# Patient Record
Sex: MALE | Race: WHITE | NOT HISPANIC OR LATINO | URBAN - METROPOLITAN AREA
[De-identification: names, ages, dates, MRNs, and addresses within clinical notes are randomized per-mention and may not be internally consistent; named-entity substitution may affect disease eponyms.]

---

## 2017-02-22 ENCOUNTER — EMERGENCY (EMERGENCY)
Facility: HOSPITAL | Age: 32
LOS: 1 days | Discharge: HOME | End: 2017-02-22
Attending: EMERGENCY MEDICINE | Admitting: EMERGENCY MEDICINE
Payer: COMMERCIAL

## 2017-02-22 VITALS
SYSTOLIC BLOOD PRESSURE: 145 MMHG | TEMPERATURE: 98 F | DIASTOLIC BLOOD PRESSURE: 95 MMHG | OXYGEN SATURATION: 98 % | RESPIRATION RATE: 18 BRPM | HEART RATE: 70 BPM

## 2017-02-22 VITALS
OXYGEN SATURATION: 100 % | DIASTOLIC BLOOD PRESSURE: 96 MMHG | SYSTOLIC BLOOD PRESSURE: 138 MMHG | TEMPERATURE: 98 F | RESPIRATION RATE: 16 BRPM | HEART RATE: 65 BPM

## 2017-02-22 DIAGNOSIS — M79.642 PAIN IN LEFT HAND: ICD-10-CM

## 2017-02-22 PROCEDURE — 12041 INTMD RPR N-HF/GENIT 2.5CM/<: CPT

## 2017-02-22 PROCEDURE — 99283 EMERGENCY DEPT VISIT LOW MDM: CPT | Mod: 25

## 2017-02-22 PROCEDURE — 73130 X-RAY EXAM OF HAND: CPT

## 2017-02-22 PROCEDURE — 73130 X-RAY EXAM OF HAND: CPT | Mod: 26,76,LT

## 2017-02-22 PROCEDURE — 90471 IMMUNIZATION ADMIN: CPT

## 2017-02-22 PROCEDURE — 90715 TDAP VACCINE 7 YRS/> IM: CPT

## 2017-02-22 RX ORDER — TETANUS TOXOID, REDUCED DIPHTHERIA TOXOID AND ACELLULAR PERTUSSIS VACCINE, ADSORBED 5; 2.5; 8; 8; 2.5 [IU]/.5ML; [IU]/.5ML; UG/.5ML; UG/.5ML; UG/.5ML
0.5 SUSPENSION INTRAMUSCULAR ONCE
Qty: 0 | Refills: 0 | Status: COMPLETED | OUTPATIENT
Start: 2017-02-22 | End: 2017-02-22

## 2017-02-22 RX ADMIN — TETANUS TOXOID, REDUCED DIPHTHERIA TOXOID AND ACELLULAR PERTUSSIS VACCINE, ADSORBED 0.5 MILLILITER(S): 5; 2.5; 8; 8; 2.5 SUSPENSION INTRAMUSCULAR at 13:04

## 2017-02-22 NOTE — ED PROVIDER NOTE - PHYSICAL EXAMINATION
Attending note. Patient is alert and oriented and in no acute distress. There is a 2 cm laceration on the distal end of the left hand stump. Small metal fragments are visible. Sensation is normal.

## 2017-02-22 NOTE — ED PROVIDER NOTE - CARE PLAN
Principal Discharge DX:	Laceration of left hand, initial encounter  Secondary Diagnosis:	Foreign body (FB) in soft tissue Principal Discharge DX:	Laceration of left hand, initial encounter  Instructions for follow-up, activity and diet:	1. Follow up with your PMD within 48-72 hours for wound check.  2. Keep sutures covered and dry for 24 hours then clean with soap and water daily.  Apply bacitracin and cover.  Return to ED or PMD for suture removal 10-14 days.  3. Any increased pain, redness, streaking (red lines), swelling, purulent drainage, feve/chills return to ER.  Secondary Diagnosis:	Foreign body (FB) in soft tissue

## 2017-02-22 NOTE — ED ADULT NURSE NOTE - OBJECTIVE STATEMENT
30yo male pt AxOx3 ambulatory to ED s/p finger injury. Pt states he was working w/ drillbit and lost  and drillbit cut left thumb. 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 Pt denies pain/discomfort at this time. NAD noted. PHUONG. MD at bedside for eval. Awaiting dispo.

## 2017-02-22 NOTE — ED PROVIDER NOTE - PLAN OF CARE
1. Follow up with your PMD within 48-72 hours for wound check.  2. Keep sutures covered and dry for 24 hours then clean with soap and water daily.  Apply bacitracin and cover.  Return to ED or PMD for suture removal 10-14 days.  3. Any increased pain, redness, streaking (red lines), swelling, purulent drainage, feve/chills return to ER.

## 2017-02-22 NOTE — ED PROVIDER NOTE - OBJECTIVE STATEMENT
31M no PMH presenting with L hand laceration s/p drilling metal 1 hour ago. Pt reports drill shavings in hand that he tried to pick out himself. Unknown last tetanus. Denies numbness/tingling, fever/chills. 31M no PMH presenting with L hand laceration s/p drilling metal 1 hour ago. Pt reports drill shavings in hand that he tried to pick out himself. Unknown last tetanus. Denies numbness/tingling, fever/chills.       Attending note. Patient was seen in fast track room #5. Patient was drilling brass when the drill slips in and drill bits into his left hand. Patient picked out metal fragments. Patient denies any numbness or paresthesia. 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

## 2017-02-22 NOTE — ED PROVIDER NOTE - MEDICAL DECISION MAKING DETAILS
attending note-laceration of left hand stump with metallic for bodies. Removal of all visible metallic from bodies and an x-ray, tetanus prophylaxis

## 2017-02-25 ENCOUNTER — EMERGENCY (EMERGENCY)
Facility: HOSPITAL | Age: 32
LOS: 1 days | Discharge: HOME | End: 2017-02-25
Attending: EMERGENCY MEDICINE | Admitting: EMERGENCY MEDICINE
Payer: COMMERCIAL

## 2017-02-25 VITALS
RESPIRATION RATE: 16 BRPM | DIASTOLIC BLOOD PRESSURE: 97 MMHG | WEIGHT: 149.91 LBS | SYSTOLIC BLOOD PRESSURE: 157 MMHG | HEIGHT: 68 IN | HEART RATE: 50 BPM | OXYGEN SATURATION: 100 %

## 2017-02-25 DIAGNOSIS — Z88.2 ALLERGY STATUS TO SULFONAMIDES: ICD-10-CM

## 2017-02-25 DIAGNOSIS — R10.9 UNSPECIFIED ABDOMINAL PAIN: ICD-10-CM

## 2017-02-25 DIAGNOSIS — N23 UNSPECIFIED RENAL COLIC: ICD-10-CM

## 2017-02-25 LAB
ALBUMIN SERPL ELPH-MCNC: 4.6 G/DL — SIGNIFICANT CHANGE UP (ref 3.3–5)
ALP SERPL-CCNC: 49 U/L — SIGNIFICANT CHANGE UP (ref 40–120)
ALT FLD-CCNC: 23 U/L RC — SIGNIFICANT CHANGE UP (ref 10–45)
ANION GAP SERPL CALC-SCNC: 16 MMOL/L — SIGNIFICANT CHANGE UP (ref 5–17)
APPEARANCE UR: CLEAR — SIGNIFICANT CHANGE UP
APTT BLD: 29 SEC — SIGNIFICANT CHANGE UP (ref 27.5–37.4)
AST SERPL-CCNC: 23 U/L — SIGNIFICANT CHANGE UP (ref 10–40)
BASE EXCESS BLDV CALC-SCNC: 1.1 MMOL/L — SIGNIFICANT CHANGE UP (ref -2–2)
BASOPHILS # BLD AUTO: 0.1 K/UL — SIGNIFICANT CHANGE UP (ref 0–0.2)
BASOPHILS NFR BLD AUTO: 0.5 % — SIGNIFICANT CHANGE UP (ref 0–2)
BILIRUB SERPL-MCNC: 0.5 MG/DL — SIGNIFICANT CHANGE UP (ref 0.2–1.2)
BILIRUB UR-MCNC: NEGATIVE — SIGNIFICANT CHANGE UP
BUN SERPL-MCNC: 20 MG/DL — SIGNIFICANT CHANGE UP (ref 7–23)
CA-I SERPL-SCNC: 1.29 MMOL/L — SIGNIFICANT CHANGE UP (ref 1.12–1.3)
CALCIUM SERPL-MCNC: 9.6 MG/DL — SIGNIFICANT CHANGE UP (ref 8.4–10.5)
CHLORIDE BLDV-SCNC: 105 MMOL/L — SIGNIFICANT CHANGE UP (ref 96–108)
CHLORIDE SERPL-SCNC: 101 MMOL/L — SIGNIFICANT CHANGE UP (ref 96–108)
CO2 BLDV-SCNC: 30 MMOL/L — SIGNIFICANT CHANGE UP (ref 22–30)
CO2 SERPL-SCNC: 25 MMOL/L — SIGNIFICANT CHANGE UP (ref 22–31)
COLOR SPEC: SIGNIFICANT CHANGE UP
CREAT SERPL-MCNC: 1.24 MG/DL — SIGNIFICANT CHANGE UP (ref 0.5–1.3)
DIFF PNL FLD: ABNORMAL
EOSINOPHIL # BLD AUTO: 0.2 K/UL — SIGNIFICANT CHANGE UP (ref 0–0.5)
EOSINOPHIL NFR BLD AUTO: 1.3 % — SIGNIFICANT CHANGE UP (ref 0–6)
EPI CELLS # UR: SIGNIFICANT CHANGE UP /HPF
GAS PNL BLDV: 141 MMOL/L — SIGNIFICANT CHANGE UP (ref 136–145)
GAS PNL BLDV: SIGNIFICANT CHANGE UP
GAS PNL BLDV: SIGNIFICANT CHANGE UP
GLUCOSE BLDV-MCNC: 100 MG/DL — HIGH (ref 70–99)
GLUCOSE SERPL-MCNC: 102 MG/DL — HIGH (ref 70–99)
GLUCOSE UR QL: NEGATIVE — SIGNIFICANT CHANGE UP
HCO3 BLDV-SCNC: 28 MMOL/L — SIGNIFICANT CHANGE UP (ref 21–29)
HCT VFR BLD CALC: 44.4 % — SIGNIFICANT CHANGE UP (ref 39–50)
HCT VFR BLDA CALC: 48 % — SIGNIFICANT CHANGE UP (ref 39–50)
HGB BLD CALC-MCNC: 15.5 G/DL — SIGNIFICANT CHANGE UP (ref 13–17)
HGB BLD-MCNC: 15.2 G/DL — SIGNIFICANT CHANGE UP (ref 13–17)
INR BLD: 0.92 RATIO — SIGNIFICANT CHANGE UP (ref 0.88–1.16)
KETONES UR-MCNC: NEGATIVE — SIGNIFICANT CHANGE UP
LACTATE BLDV-MCNC: 1.5 MMOL/L — SIGNIFICANT CHANGE UP (ref 0.7–2)
LEUKOCYTE ESTERASE UR-ACNC: NEGATIVE — SIGNIFICANT CHANGE UP
LYMPHOCYTES # BLD AUTO: 1.3 K/UL — SIGNIFICANT CHANGE UP (ref 1–3.3)
LYMPHOCYTES # BLD AUTO: 10.4 % — LOW (ref 13–44)
MCHC RBC-ENTMCNC: 31.5 PG — SIGNIFICANT CHANGE UP (ref 27–34)
MCHC RBC-ENTMCNC: 34.2 GM/DL — SIGNIFICANT CHANGE UP (ref 32–36)
MCV RBC AUTO: 92.1 FL — SIGNIFICANT CHANGE UP (ref 80–100)
MONOCYTES # BLD AUTO: 0.6 K/UL — SIGNIFICANT CHANGE UP (ref 0–0.9)
MONOCYTES NFR BLD AUTO: 4.9 % — SIGNIFICANT CHANGE UP (ref 2–14)
NEUTROPHILS # BLD AUTO: 10.6 K/UL — HIGH (ref 1.8–7.4)
NEUTROPHILS NFR BLD AUTO: 82.8 % — HIGH (ref 43–77)
NITRITE UR-MCNC: NEGATIVE — SIGNIFICANT CHANGE UP
PCO2 BLDV: 55 MMHG — HIGH (ref 35–50)
PH BLDV: 7.32 — LOW (ref 7.35–7.45)
PH UR: 6 — SIGNIFICANT CHANGE UP (ref 4.8–8)
PLATELET # BLD AUTO: 181 K/UL — SIGNIFICANT CHANGE UP (ref 150–400)
PO2 BLDV: 37 MMHG — SIGNIFICANT CHANGE UP (ref 25–45)
POTASSIUM BLDV-SCNC: 3.6 MMOL/L — SIGNIFICANT CHANGE UP (ref 3.5–5)
POTASSIUM SERPL-MCNC: 3.9 MMOL/L — SIGNIFICANT CHANGE UP (ref 3.5–5.3)
POTASSIUM SERPL-SCNC: 3.9 MMOL/L — SIGNIFICANT CHANGE UP (ref 3.5–5.3)
PROT SERPL-MCNC: 7.1 G/DL — SIGNIFICANT CHANGE UP (ref 6–8.3)
PROT UR-MCNC: NEGATIVE — SIGNIFICANT CHANGE UP
PROTHROM AB SERPL-ACNC: 10 SEC — SIGNIFICANT CHANGE UP (ref 10–13.1)
RBC # BLD: 4.82 M/UL — SIGNIFICANT CHANGE UP (ref 4.2–5.8)
RBC # FLD: 11.2 % — SIGNIFICANT CHANGE UP (ref 10.3–14.5)
RBC CASTS # UR COMP ASSIST: ABNORMAL /HPF (ref 0–2)
SAO2 % BLDV: 67 % — SIGNIFICANT CHANGE UP (ref 67–88)
SODIUM SERPL-SCNC: 142 MMOL/L — SIGNIFICANT CHANGE UP (ref 135–145)
SP GR SPEC: 1.01 — LOW (ref 1.01–1.02)
UROBILINOGEN FLD QL: NEGATIVE — SIGNIFICANT CHANGE UP
WBC # BLD: 12.8 K/UL — HIGH (ref 3.8–10.5)
WBC # FLD AUTO: 12.8 K/UL — HIGH (ref 3.8–10.5)
WBC UR QL: SIGNIFICANT CHANGE UP /HPF (ref 0–5)

## 2017-02-25 PROCEDURE — 87086 URINE CULTURE/COLONY COUNT: CPT

## 2017-02-25 PROCEDURE — 84295 ASSAY OF SERUM SODIUM: CPT

## 2017-02-25 PROCEDURE — 82947 ASSAY GLUCOSE BLOOD QUANT: CPT

## 2017-02-25 PROCEDURE — 85027 COMPLETE CBC AUTOMATED: CPT

## 2017-02-25 PROCEDURE — 82330 ASSAY OF CALCIUM: CPT

## 2017-02-25 PROCEDURE — 85014 HEMATOCRIT: CPT

## 2017-02-25 PROCEDURE — 85730 THROMBOPLASTIN TIME PARTIAL: CPT

## 2017-02-25 PROCEDURE — 85610 PROTHROMBIN TIME: CPT

## 2017-02-25 PROCEDURE — 82435 ASSAY OF BLOOD CHLORIDE: CPT

## 2017-02-25 PROCEDURE — 99284 EMERGENCY DEPT VISIT MOD MDM: CPT | Mod: 25

## 2017-02-25 PROCEDURE — 84132 ASSAY OF SERUM POTASSIUM: CPT

## 2017-02-25 PROCEDURE — 99285 EMERGENCY DEPT VISIT HI MDM: CPT | Mod: 25

## 2017-02-25 PROCEDURE — 81001 URINALYSIS AUTO W/SCOPE: CPT

## 2017-02-25 PROCEDURE — 83605 ASSAY OF LACTIC ACID: CPT

## 2017-02-25 PROCEDURE — 82803 BLOOD GASES ANY COMBINATION: CPT

## 2017-02-25 PROCEDURE — 74177 CT ABD & PELVIS W/CONTRAST: CPT | Mod: 26

## 2017-02-25 PROCEDURE — 99053 MED SERV 10PM-8AM 24 HR FAC: CPT

## 2017-02-25 PROCEDURE — 96374 THER/PROPH/DIAG INJ IV PUSH: CPT | Mod: XU

## 2017-02-25 PROCEDURE — 80053 COMPREHEN METABOLIC PANEL: CPT

## 2017-02-25 PROCEDURE — 74177 CT ABD & PELVIS W/CONTRAST: CPT

## 2017-02-25 RX ORDER — KETOROLAC TROMETHAMINE 30 MG/ML
30 SYRINGE (ML) INJECTION ONCE
Qty: 0 | Refills: 0 | Status: DISCONTINUED | OUTPATIENT
Start: 2017-02-25 | End: 2017-02-25

## 2017-02-25 RX ORDER — OXYCODONE HYDROCHLORIDE 5 MG/1
1 TABLET ORAL
Qty: 15 | Refills: 0 | OUTPATIENT
Start: 2017-02-25

## 2017-02-25 RX ORDER — SODIUM CHLORIDE 9 MG/ML
3 INJECTION INTRAMUSCULAR; INTRAVENOUS; SUBCUTANEOUS ONCE
Qty: 0 | Refills: 0 | Status: COMPLETED | OUTPATIENT
Start: 2017-02-25 | End: 2017-02-25

## 2017-02-25 RX ORDER — SODIUM CHLORIDE 9 MG/ML
1000 INJECTION INTRAMUSCULAR; INTRAVENOUS; SUBCUTANEOUS
Qty: 0 | Refills: 0 | Status: DISCONTINUED | OUTPATIENT
Start: 2017-02-25 | End: 2017-03-01

## 2017-02-25 RX ADMIN — SODIUM CHLORIDE 125 MILLILITER(S): 9 INJECTION INTRAMUSCULAR; INTRAVENOUS; SUBCUTANEOUS at 08:44

## 2017-02-25 RX ADMIN — Medication 30 MILLIGRAM(S): at 12:39

## 2017-02-25 RX ADMIN — SODIUM CHLORIDE 3 MILLILITER(S): 9 INJECTION INTRAMUSCULAR; INTRAVENOUS; SUBCUTANEOUS at 08:37

## 2017-02-25 NOTE — ED PROVIDER NOTE - PHYSICAL EXAMINATION
att exam: patient awake alert NAD . LUNGS CTAB no wheeze no crackle. CARD RRR no m/r/g.  Abdomen soft TTP to the RLQ ND no rebound no guarding no CVA tenderness. EXT WWP no edema no calf tenderness CV 2+DP/PT bilaterally. neuro A&Ox3 gait normal.  skin warm and dry no rash

## 2017-02-25 NOTE — ED ADULT NURSE NOTE - OBJECTIVE STATEMENT
Pt presents to the ER A+Ox3 complaining of left lower quadrant pain radiating to middle lower abdominal pain, nausea, vomiting, and chills. Pt describes the pain as "always there but intensifies in waves;" pain was "severe enough to wake me up out of my sleep last night." Pt denies fever, diarrhea, difficulty or painful urination.

## 2017-02-25 NOTE — ED PROVIDER NOTE - OBJECTIVE STATEMENT
33 yo with no hx comes to the ED with complaints of abd pain that began last night on and off and now constant, had BM today, no bleeding no dysuria no  hematuria, + n/v no f.c no cp no sob. no previous abd surgeries

## 2017-02-26 LAB
CULTURE RESULTS: NO GROWTH — SIGNIFICANT CHANGE UP
SPECIMEN SOURCE: SIGNIFICANT CHANGE UP

## 2017-02-27 NOTE — ED PROCEDURE NOTE - CPROC ED POST PROC CARE GUIDE1
Verbal/written post procedure instructions were given to patient/caregiver.
Instructed patient/caregiver regarding signs and symptoms of infection./Instructed patient/caregiver to follow-up with primary care physician./Verbal/written post procedure instructions were given to patient/caregiver.

## 2018-08-24 ENCOUNTER — TRANSCRIPTION ENCOUNTER (OUTPATIENT)
Age: 33
End: 2018-08-24

## 2022-09-19 ENCOUNTER — EMERGENCY (EMERGENCY)
Facility: HOSPITAL | Age: 37
LOS: 1 days | Discharge: HOME | End: 2022-09-19
Payer: SELF-PAY

## 2022-09-19 VITALS
RESPIRATION RATE: 16 BRPM | HEIGHT: 69 IN | WEIGHT: 160.06 LBS | SYSTOLIC BLOOD PRESSURE: 160 MMHG | HEART RATE: 74 BPM | DIASTOLIC BLOOD PRESSURE: 99 MMHG | OXYGEN SATURATION: 97 % | TEMPERATURE: 98 F

## 2022-09-19 VITALS
HEART RATE: 60 BPM | RESPIRATION RATE: 16 BRPM | TEMPERATURE: 98 F | DIASTOLIC BLOOD PRESSURE: 104 MMHG | OXYGEN SATURATION: 99 % | SYSTOLIC BLOOD PRESSURE: 157 MMHG

## 2022-09-19 PROCEDURE — 29125 APPL SHORT ARM SPLINT STATIC: CPT

## 2022-09-19 PROCEDURE — 29130 APPL FINGER SPLINT STATIC: CPT | Mod: F5

## 2022-09-19 PROCEDURE — 73140 X-RAY EXAM OF FINGER(S): CPT

## 2022-09-19 PROCEDURE — 73130 X-RAY EXAM OF HAND: CPT

## 2022-09-19 PROCEDURE — 73130 X-RAY EXAM OF HAND: CPT | Mod: 26,RT

## 2022-09-19 PROCEDURE — 99283 EMERGENCY DEPT VISIT LOW MDM: CPT | Mod: 25

## 2022-09-19 PROCEDURE — 99284 EMERGENCY DEPT VISIT MOD MDM: CPT | Mod: 25

## 2022-09-19 NOTE — ED PROVIDER NOTE - SHIFT CHANGE DETAILS
Attending MD Stallworth: 37M with R thumb pain, +snuff box tenderness, pending XR, ulnar lig intact, will need thumb spica

## 2022-09-19 NOTE — ED PROVIDER NOTE - NSFOLLOWUPCLINICS_GEN_ALL_ED_FT
[facility] Orthopedic Surgery  Orthopedic Surgery  [address], [address]rd & 4th [place] [address]  Phone: [phone]  Fax:

## 2022-09-19 NOTE — ED PROVIDER NOTE - ADDITIONAL NOTES AND INSTRUCTIONS:
Please excuse Mr. Rodrigues from work involving significant use of his R hand or thumb due to thumb injury.

## 2022-09-19 NOTE — ED ADULT NURSE NOTE - NSIMPLEMENTINTERV_GEN_ALL_ED
Implemented All Universal Safety Interventions:  [place] to call system. Call bell, personal items and telephone within reach. Instruct patient to call for assistance. Room bathroom lighting operational. Non-slip footwear when patient is off stretcher. Physically safe environment: no spills, clutter or unnecessary equipment. Stretcher in lowest position, wheels locked, appropriate side rails in place.

## 2022-09-19 NOTE — ED PROVIDER NOTE - PROGRESS NOTE DETAILS
Lance Hankins MD PGY1: XR neg, will place in thumb spica. Lance Hankins MD PGY1: Pt placed in thumb spica, see procedure note. Discussed with pt splint care, follow up with ortho, analgesia, return precautions. Pt understands is amenable to plan. Attending MD Stallworth: Patient re-evaluated and radiology test reviewed with patient.  Explained thumb spica for snuffbox tenderness, explained although mechanism not consistent, concern for scaphoid injury given snuffbox tenderness.  Will follow up with hand.  Patient stable for discharge. Follow up instructions given, importance of follow up emphasized, return to ED parameters reviewed and patient verbalized understanding.  All questions answered, all concerns addressed.

## 2022-09-19 NOTE — ED PROVIDER NOTE - PATIENT PORTAL LINK FT
You can access the FollowMyHealth Patient Portal offered by [facility] by registering at the following website: [email or web address]. By joining [employer]’s FollowMyHealth portal, you will also be able to view your health information using other applications (apps) compatible with our system.

## 2022-09-19 NOTE — ED ADULT NURSE NOTE - OBJECTIVE STATEMENT
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 pt AOx3 speaking in full complete sentences at present. pt denies any lightheadedness, dizziness, chest pain, SOB, abd. pain, n/v/d, numbness/tingling at present.

## 2022-09-19 NOTE — ED PROVIDER NOTE - PHYSICAL EXAMINATION
CONSTITUTIONAL: Well-developed; well-nourished; in no acute distress.   SKIN: warm, dry  HEAD: Normocephalic; atraumatic.  EYES: no conjunctival injection. no scleral icterus  ENT: No nasal discharge; airway clear.  NECK: Supple; good ROM  CARD: R Thumb with good cap refill and color, radial pulse intact  ABD: soft nondistended  EXT: R side snuffbox tenderness, thumb with full ROM, 4+/5 strength to adduction/abduction, no UCL laxity, full sensation  NEURO: Alert, oriented, grossly unremarkable  PSYCH: Cooperative, appropriate. CONSTITUTIONAL: Well-developed; well-nourished; in no acute distress.   SKIN: warm, dry  HEAD: Normocephalic; atraumatic.  EYES: no conjunctival injection. no scleral icterus  ENT: No nasal discharge; airway clear.  NECK: Supple; good ROM  CARD: R Thumb with good cap refill and color, radial pulse intact  ABD: soft nondistended  EXT: R side snuffbox tenderness, thumb with full ROM, 4+/5 strength to adduction/abduction, no UCL laxity, full sensation. Hypoplastic L fingers  NEURO: Alert, oriented, grossly unremarkable  PSYCH: Cooperative, appropriate.

## 2022-09-19 NOTE — ED PROVIDER NOTE - NSFOLLOWUPINSTRUCTIONS_ED_ALL_ED_FT
You were seen in the ED for thumb pain following an injury.    X rays showed no fracture. You were placed in a thumb splint to protect your scaphoid bone. Please keep this splint dry, using a cast bag for showering.    Please follow up with the hand orthopedist as discussed. Contact information with orthopedics is below.    You may take tylenol as per package instructions for pain.    Please seek medical attention if you lose feeling in your fingers, you have significantly worsening pain, or if you develop new fevers, rashes, or swelling. You were seen in the ED for thumb pain following an injury.    X rays showed no fracture. You were placed in a thumb splint to protect your scaphoid bone. Please keep this splint dry, using a cast bag for showering.    Please follow up with the hand orthopedist as discussed. Contact information with orthopedics is below.    You may take tylenol as per package instructions for pain.    Please seek medical attention if you lose feeling in your fingers, you have significantly worsening pain, or if you develop new fevers, rashes, or swelling.    While here, your blood pressure was elevated.  Please follow up with your primary care doctor in regards to this matter in the next 1-3 days.

## 2022-09-19 NOTE — ED ADULT NURSE NOTE - NSFALLRSKHARMRISK_ED_ALL_ED
[FreeTextEntry8] : Pt c/o radicular pain in RLE for past 3 months. Pt reports there is pain that shoots down his leg into his foot whenever he touches lateral aspect of leg. Pain is sharp and shooting, described as electric and severe. Pain has superficial skin lesion on lateral leg which has remained stable. Denies trauma to leg. Pain has been persistent in past several months despite seeing a chiropractor. Pain has started to occur on L leg as well in past 1-2 weeks. Pt has seen neuro and derm who suspect there may be a lumbar radiculopathy component to pain. no

## 2022-09-19 NOTE — ED PROVIDER NOTE - OBJECTIVE STATEMENT
Pt is a 37m with no sig pmh presenting with R thumb pain. Began at 10am this morning when hyperextending thumb while lifting heavy objects at work as . Had pain after incident gradually worsening until now. Denies sensory loss, tingling. Is able to move thumb with some pain, has reduced  strength. Has not taken pain medications for thumb.

## 2022-09-19 NOTE — ED PROVIDER NOTE - ATTENDING CONTRIBUTION TO CARE
MD Cuevas:  patient seen and evaluated with the resident.  I was present for key portions of the History & Physical, and I agree with the Impression & Plan.  36 yo M, c/o R thumb pain, base of metacarpal  Duration: onset 5hrs ago.  Quality: sharp pain  Intensity:  6/10  Gen:  Well appearing in NAD.  Head:  NC/AT.  Resp: No distress.    Ext: L hand hypoplastic  R hand:  +pain in snuffbox and at base of thumb metacarpal.  Skin: warm and dry as visualized.  Neuro: alert and oriented to person, place, time.  Impression:  R thumb injury with snuffbox TTP  Plan:  will need thumb spica regardless if xrays positive or negative.

## 2022-09-19 NOTE — ED PROVIDER NOTE - CLINICAL SUMMARY MEDICAL DECISION MAKING FREE TEXT BOX
Electrodesiccation Text: The wound bed was treated with electrodesiccation after the biopsy was performed. Pt is a 37m with no sig pmh presenting with R thumb pain. Currently with vss, no neurological deficit, thumb pain following hyperextension injury, no UCL laxity c/f gamekeeper's thumb, with snuffbox tenderness. Concern for fracture vs sprain, to be placed in thumb spica following imaging given snuffbox tenderness. Declines analgesia at this time.

## 2022-09-19 NOTE — ED ADULT NURSE NOTE - NS ED NURSE DISCH DISPOSITION
[facility]  PDMP information  Reviewed by me. Therapy appropriate.prescription appropriate.   Discharged

## 2022-09-22 ENCOUNTER — APPOINTMENT (OUTPATIENT)
Dept: ORTHOPEDIC SURGERY | Facility: CLINIC | Age: 37
End: 2022-09-22

## 2022-10-05 ENCOUNTER — APPOINTMENT (OUTPATIENT)
Dept: MRI IMAGING | Facility: CLINIC | Age: 37
End: 2022-10-05

## 2022-10-17 ENCOUNTER — OUTPATIENT (OUTPATIENT)
Dept: OUTPATIENT SERVICES | Facility: HOSPITAL | Age: 37
LOS: 1 days | End: 2022-10-17
Payer: COMMERCIAL

## 2022-10-17 ENCOUNTER — APPOINTMENT (OUTPATIENT)
Dept: MRI IMAGING | Facility: IMAGING CENTER | Age: 37
End: 2022-10-17

## 2022-10-17 DIAGNOSIS — M79.641 PAIN IN RIGHT HAND: ICD-10-CM

## 2022-10-17 PROCEDURE — 73218 MRI UPPER EXTREMITY W/O DYE: CPT | Mod: 26,RT

## 2022-10-17 PROCEDURE — 73218 MRI UPPER EXTREMITY W/O DYE: CPT

## 2024-04-02 ENCOUNTER — APPOINTMENT (OUTPATIENT)
Dept: OPHTHALMOLOGY | Facility: CLINIC | Age: 39
End: 2024-04-02
Payer: COMMERCIAL

## 2024-04-02 ENCOUNTER — EMERGENCY (EMERGENCY)
Facility: HOSPITAL | Age: 39
LOS: 1 days | Discharge: HOME | End: 2024-04-02
Attending: EMERGENCY MEDICINE
Payer: COMMERCIAL

## 2024-04-02 ENCOUNTER — NON-APPOINTMENT (OUTPATIENT)
Age: 39
End: 2024-04-02

## 2024-04-02 VITALS
RESPIRATION RATE: 16 BRPM | SYSTOLIC BLOOD PRESSURE: 148 MMHG | OXYGEN SATURATION: 98 % | HEART RATE: 72 BPM | TEMPERATURE: 98 F | DIASTOLIC BLOOD PRESSURE: 96 MMHG | HEIGHT: 68 IN | WEIGHT: 160.06 LBS

## 2024-04-02 PROCEDURE — 99282 EMERGENCY DEPT VISIT SF MDM: CPT

## 2024-04-02 PROCEDURE — 92004 COMPRE OPH EXAM NEW PT 1/>: CPT | Mod: 25

## 2024-04-02 PROCEDURE — 65222 REMOVE FOREIGN BODY FROM EYE: CPT | Mod: RT

## 2024-04-02 PROCEDURE — 99283 EMERGENCY DEPT VISIT LOW MDM: CPT

## 2024-04-02 RX ORDER — IBUPROFEN 200 MG
400 TABLET ORAL ONCE
Refills: 0 | Status: COMPLETED | OUTPATIENT
Start: 2024-04-02 | End: 2024-04-02

## 2024-04-02 RX ADMIN — Medication 400 MILLIGRAM(S): at 11:31

## 2024-04-02 NOTE — ED PROVIDER NOTE - ATTENDING APP SHARED VISIT CONTRIBUTION OF CARE
Attending MD Correa: I personally made/approved the management plan and take responsibility for the patient management.    39-year-old gentleman is presenting for pain and foreign body sensation to the right eye since yesterday.  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  Tetanus is up-to-date.  He reports his partner placed tetracaine in the eye and with a Q-tip attempted to remove a foreign body however patient still states he feels a foreign body sensation in the right eye.  Not a contact lens wear.    Vital signs are nonactionable.  He is sitting in the stretcher in no apparent distress.  Steady gait.  Examination of right eye reveals mild conjunctival injection, there is a noticeable 1 mm circular corneal foreign body just superior to the pupil at 12:00 location.  Extraocular movements intact bilaterally pupils equal round and reactive to light bilaterally.    Patient presenting for evaluation of corneal foreign body OD.  Will perform fluorescein stain and assess foreign body further to see if we can attempt bedside removal.      *The above represents an initial assessment/impression. Please refer to progress notes for potential changes in patient clinical course*

## 2024-04-02 NOTE — ED ADULT NURSE REASSESSMENT NOTE - NS ED NURSE REASSESS COMMENT FT1
Rn arrived to area at 11am, patient already in area and being seen and treated by MD Correa. Not assessed by RN upon first arrival.

## 2024-04-02 NOTE — ED PROVIDER NOTE - PATIENT PORTAL LINK FT
You can access the FollowMyHealth Patient Portal offered by [facility] by registering at the following website: [email or web address]. By joining [employer]’s FollowMyHealth portal, you will also be able to view your health information using other applications (apps) compatible with our system.

## 2024-04-02 NOTE — ED PROVIDER NOTE - OBJECTIVE STATEMENT
Refill authorized per protocol.     39-year-old male presenting to the emergency department with a foreign body sensation to the right eye.  Patient noticed this yesterday after coming home from work.  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  Went home yesterday with some irritation and swelling.  Woke up this morning symptoms was worse associated with some blurred vision and feels that photosensitivity.  Tetanus is up-to-date noticed that he may have a spot on his cornea that he tried to remove with a Q-tip but was unsuccessful.  No other complaints at this time no pain with eye movement no fevers no chills.  Patient does not wear contacts.

## 2024-04-02 NOTE — ED ADULT NURSE REASSESSMENT NOTE - NS ED NURSE REASSESS COMMENT FT1
Patient not assessed by RN prior to dc. Patient seen treated and dc by RAZA Guzman. Patient has follow up appointment at 1pm today.

## 2024-04-02 NOTE — ED PROVIDER NOTE - NSFOLLOWUPINSTRUCTIONS_ED_ALL_ED_FT
1- You have an appointment with   Dr Harley at   1PM  [address] [id number]  2- Motrin for pain  3- If you have any issue getting to your appointment or being seen today or if you have any new or worsening complaints come back to the ER immediately

## 2024-10-05 NOTE — ED PROVIDER NOTE - SKIN WOUND TYPE
Discharge education completed with patient. Informed of follow up appointments, activity restrictions, dressing care, and medications. Medications to be delivered to patient prior to dc. Pt denied any questions or concerns. Pt to dc home once medications are delivered.   LACERATION(S)

## 2025-04-17 NOTE — ED ADULT TRIAGE NOTE - NS ED NURSE DIRECT TO ROOM YN
Physical Therapy Daily Treatment Note                          Patient: Shayna Abreu   : 1976  Diagnosis/ICD-10 Code:  Acute postoperative pain of right shoulder [G89.18, M25.511]  Referring practitioner: Dulce Sotelo MD  Date of Initial Visit: Type: THERAPY  Noted: 2025  Today's Date: 2025  Patient seen for 25 sessions           Subjective   The patient reported she has an EMG ordered, the stretches help her pulling and tingling for a short time before it returns.     Objective   See Exercise, Manual, and Modality Logs for complete treatment.     Assessment/Plan     Patient tolerated today's treatment without complaints of pain. Improvements noted in shoulder symptoms following biceps isometrics. Strength, ROM, and increased pain with activity deficits still limits patient's ability to perform OH tasks. Further skilled care indicated at this time.       Timed:  Manual Therapy:    0     mins  72756;  Therapeutic Exercise:    10     mins  44194;     Neuromuscular Concha:   10    mins  99573;    Therapeutic Activity:     10     mins  78727;     Gait Trainin     mins  07535;     Aquatics                         0      mins  43368    Un-timed:  Mechanical Traction      0     mins  87966  Dry Needling     0     mins self-pay  Electrical Stimulation:    0     mins  76126 ( );      Timed Treatment:   30   mins   Total Treatment:     30   mins    Dmitriy Rubalcava PT  Physical Therapist    Electronically signed 2025    [place] License: PT - [id number]                      
No